# Patient Record
(demographics unavailable — no encounter records)

---

## 2020-10-06 NOTE — ED
Abdominal Pain HPI





- General


Chief Complaint: Abdominal Pain


Stated Complaint: PAIN IN ABD


Time Seen by Provider: 10/06/20 11:42


Source: patient


Mode of arrival: ambulatory


Limitations: no limitations





- History of Present Illness


Initial Comments: 





Patient is a 35-year-old female presenting to the emergency department with 

chief complaint of abdominal pain.  Patient reports the pain started early this 

morning and woke her up out of her sleep.  Patient reports the pain is in the 

right flank region and radiating across to the groin.  Patient denies a history 

of kidney stones.  She denies any urinary or vaginal complaints.  Denies any 

alleviating aggravated factors.  Denies taking medication to alleviate the 

symptoms.  Denies hematuria, hematochezia or melena.  She also complains of 

right upper quadrant abdominal pain that has been ongoing for 5-6 years but her 

primary care physician is not done anything about it.  She does report.  Radial 

pain and going on for several years as well.  No nausea vomiting diarrhea.





- Related Data


                                Home Medications











 Medication  Instructions  Recorded  Confirmed


 


Escitalopram [Lexapro] 20 mg PO HS 10/06/20 10/06/20











                                    Allergies











Allergy/AdvReac Type Severity Reaction Status Date / Time


 


No Known Allergies Allergy   Verified 10/06/20 12:07














Review of Systems


ROS Statement: 


Those systems with pertinent positive or pertinent negative responses have been 

documented in the HPI.





ROS Other: All systems not noted in ROS Statement are negative.





Past Medical History


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


History of Any Multi-Drug Resistant Organisms: None Reported


Past Surgical History: Ablation


Additional Past Surgical History / Comment(s): Heart ablation


Past Psychological History: No Psychological Hx Reported


Smoking Status: Current every day smoker


Past Alcohol Use History: None Reported


Past Drug Use History: None Reported





General Exam


Limitations: no limitations


General appearance: alert, in no apparent distress, obese


Head exam: Present: atraumatic, normocephalic, normal inspection


Eye exam: Present: normal appearance, PERRL, EOMI


Pupils: Present: normal accommodation


ENT exam: Present: normal exam, normal oropharynx, mucous membranes moist, TM's 

normal bilaterally, normal external ear exam


Neck exam: Present: normal inspection, full ROM.  Absent: tenderness


Respiratory exam: Present: normal lung sounds bilaterally.  Absent: respiratory 

distress, wheezes


Cardiovascular Exam: Present: regular rate, normal rhythm, normal heart sounds


GI/Abdominal exam: Present: soft, tenderness (Right upper quadrant, right flank 

pain.  Positive Tubbs sign).  Absent: distended, guarding, rebound, rigid


Extremities exam: Present: normal inspection, full ROM, normal capillary refill.

 Absent: tenderness


Back exam: Present: normal inspection, full ROM, CVA tenderness (R).  Absent: 

tenderness, CVA tenderness (L)


Neurological exam: Present: alert, oriented X3, normal gait


Psychiatric exam: Present: normal affect, normal mood


Skin exam: Present: warm, dry, intact, normal color





Course


                                   Vital Signs











  10/06/20 10/06/20





  11:36 13:15


 


Temperature 97.1 F L 


 


Pulse Rate 61 62


 


Respiratory 16 16





Rate  


 


Blood Pressure 131/89 115/69


 


O2 Sat by Pulse 100 99





Oximetry  














Medical Decision Making





- Medical Decision Making





Patient is a 35-year-old male presenting to emergency Department with chief 

complaint of abdominal pain.  The patient's biggest complaint is right flank 

pain that started this morning, sudden onset and sharp in nature.  It is also 

colicky.  Physical examination reveals right CVA tenderness.  She also has right

upper quadrant abdominal pain.  CBC reveals mild leukocytosis.  CMP is in a 

Jesus well.  UA shows small amounts of blood and red blood cells.  I suspect the

patient has already passed a renal stone.  Right upper quadrant ultrasound was 

obtained revealing gallstones without evidence of cholecystitis.  No signs of 

hydronephrosis.  I suspect this is a chronic issue for the patient.  I will give

her contact information for a general surgeon.  Patient was advised about eating

a low-fat diet.  Strict return parameters were thoroughly discussed with patient

was standing agreeable.  Case discussed with physician.





- Lab Data


Result diagrams: 


                                 10/06/20 11:51





                                 10/06/20 11:51


                                   Lab Results











  10/06/20 10/06/20 10/06/20 Range/Units





  11:51 11:51 11:51 


 


WBC  13.5 H    (3.8-10.6)  k/uL


 


RBC  4.45    (3.80-5.40)  m/uL


 


Hgb  14.5    (11.4-16.0)  gm/dL


 


Hct  44.1    (34.0-46.0)  %


 


MCV  99.1    (80.0-100.0)  fL


 


MCH  32.5    (25.0-35.0)  pg


 


MCHC  32.8    (31.0-37.0)  g/dL


 


RDW  13.1    (11.5-15.5)  %


 


Plt Count  370    (150-450)  k/uL


 


Neutrophils %  84    %


 


Lymphocytes %  12    %


 


Monocytes %  2    %


 


Eosinophils %  2    %


 


Basophils %  0    %


 


Neutrophils #  11.3 H    (1.3-7.7)  k/uL


 


Lymphocytes #  1.6    (1.0-4.8)  k/uL


 


Monocytes #  0.3    (0-1.0)  k/uL


 


Eosinophils #  0.3    (0-0.7)  k/uL


 


Basophils #  0.1    (0-0.2)  k/uL


 


Sodium     (137-145)  mmol/L


 


Potassium     (3.5-5.1)  mmol/L


 


Chloride     ()  mmol/L


 


Carbon Dioxide     (22-30)  mmol/L


 


Anion Gap     mmol/L


 


BUN     (7-17)  mg/dL


 


Creatinine     (0.52-1.04)  mg/dL


 


Est GFR (CKD-EPI)AfAm     (>60 ml/min/1.73 sqM)  


 


Est GFR (CKD-EPI)NonAf     (>60 ml/min/1.73 sqM)  


 


Glucose     (74-99)  mg/dL


 


Calcium     (8.4-10.2)  mg/dL


 


Total Bilirubin     (0.2-1.3)  mg/dL


 


AST     (14-36)  U/L


 


ALT     (4-34)  U/L


 


Alkaline Phosphatase     ()  U/L


 


Total Protein     (6.3-8.2)  g/dL


 


Albumin     (3.5-5.0)  g/dL


 


Lipase     ()  U/L


 


Urine Color   Yellow   


 


Urine Appearance   Clear   (Clear)  


 


Urine pH   8.0   (5.0-8.0)  


 


Ur Specific Gravity   1.026   (1.001-1.035)  


 


Urine Protein   Trace H   (Negative)  


 


Urine Glucose (UA)   Negative   (Negative)  


 


Urine Ketones   Negative   (Negative)  


 


Urine Blood   Small H   (Negative)  


 


Urine Nitrite   Negative   (Negative)  


 


Urine Bilirubin   Negative   (Negative)  


 


Urine Urobilinogen   <2.0   (<2.0)  mg/dL


 


Ur Leukocyte Esterase   Negative   (Negative)  


 


Urine RBC   16 H   (0-5)  /hpf


 


Urine WBC   1   (0-5)  /hpf


 


Urine Mucus   Rare H   (None)  /hpf


 


Urine HCG, Qual    Not Detected  (Not Detectd)  














  10/06/20 Range/Units





  11:51 


 


WBC   (3.8-10.6)  k/uL


 


RBC   (3.80-5.40)  m/uL


 


Hgb   (11.4-16.0)  gm/dL


 


Hct   (34.0-46.0)  %


 


MCV   (80.0-100.0)  fL


 


MCH   (25.0-35.0)  pg


 


MCHC   (31.0-37.0)  g/dL


 


RDW   (11.5-15.5)  %


 


Plt Count   (150-450)  k/uL


 


Neutrophils %   %


 


Lymphocytes %   %


 


Monocytes %   %


 


Eosinophils %   %


 


Basophils %   %


 


Neutrophils #   (1.3-7.7)  k/uL


 


Lymphocytes #   (1.0-4.8)  k/uL


 


Monocytes #   (0-1.0)  k/uL


 


Eosinophils #   (0-0.7)  k/uL


 


Basophils #   (0-0.2)  k/uL


 


Sodium  136 L  (137-145)  mmol/L


 


Potassium  5.3 H  (3.5-5.1)  mmol/L


 


Chloride  106  ()  mmol/L


 


Carbon Dioxide  24  (22-30)  mmol/L


 


Anion Gap  6  mmol/L


 


BUN  17  (7-17)  mg/dL


 


Creatinine  0.53  (0.52-1.04)  mg/dL


 


Est GFR (CKD-EPI)AfAm  >90  (>60 ml/min/1.73 sqM)  


 


Est GFR (CKD-EPI)NonAf  >90  (>60 ml/min/1.73 sqM)  


 


Glucose  124 H  (74-99)  mg/dL


 


Calcium  9.3  (8.4-10.2)  mg/dL


 


Total Bilirubin  0.8  (0.2-1.3)  mg/dL


 


AST  29  (14-36)  U/L


 


ALT  15  (4-34)  U/L


 


Alkaline Phosphatase  52  ()  U/L


 


Total Protein  7.8  (6.3-8.2)  g/dL


 


Albumin  4.4  (3.5-5.0)  g/dL


 


Lipase  52  ()  U/L


 


Urine Color   


 


Urine Appearance   (Clear)  


 


Urine pH   (5.0-8.0)  


 


Ur Specific Gravity   (1.001-1.035)  


 


Urine Protein   (Negative)  


 


Urine Glucose (UA)   (Negative)  


 


Urine Ketones   (Negative)  


 


Urine Blood   (Negative)  


 


Urine Nitrite   (Negative)  


 


Urine Bilirubin   (Negative)  


 


Urine Urobilinogen   (<2.0)  mg/dL


 


Ur Leukocyte Esterase   (Negative)  


 


Urine RBC   (0-5)  /hpf


 


Urine WBC   (0-5)  /hpf


 


Urine Mucus   (None)  /hpf


 


Urine HCG, Qual   (Not Detectd)  














Disposition


Clinical Impression: 


 Renal stone, Right flank pain, Gallstone





Disposition: HOME SELF-CARE


Condition: Stable


Instructions (If sedation given, give patient instructions):  Kidney Stones 

(ED), Cholecystitis (ED), Low Fat Diet (ED)


Additional Instructions: 


Please follow up with a general surgeon.  It a low-fat diet.  Return to 

emergency department if symptoms worsen.


Is patient prescribed a controlled substance at d/c from ED?: No


Referrals: 


MADDIE Dominguez III, MD [Primary Care Provider] - 1-2 days


Agustin Nunez MD [Medical Doctor] - 1-2 days


Time of Disposition: 14:03

## 2020-10-06 NOTE — US
EXAMINATION TYPE: US abdomen limited

 

DATE OF EXAM: 10/6/2020

 

COMPARISON: [facility] 2013

 

CLINICAL HISTORY: ruq pain. Right lateral abd pain radiating downward, noted after meals, nausea toda
y

 

EXAM MEASUREMENTS:

 

Liver Length:  17.5 cm   

Gallbladder Wall:  0.3 cm   

CBD:  0.4 cm

Right Kidney:  11.0 x 5.5 x 4.5 cm

 

 

 

Pancreas: Homogeneously hyperechoic

Liver:  attenuated posteriorly suggests fatty liver  

Gallbladder:  multiple shadowing stones seen with limited mobility of stones 

**Evidence for sonographic Tubbs's sign:  tender here per patient

CBD:  wnl 

Right Kidney:  No hydronephrosis or masses seen 

 

Pancreas shows no worrisome mass or ductal dilatation images saved. IVC seen hepatic dome. Visualized
 liver is heterogeneous without worrisome mass on images saved. Evaluation for masses suboptimal due 
to the heterogeneity. Common bile duct measures within normal limits. Right kidney shows no hydroneph
rosis. Gallbladder seen with shadowing mobile gallstones. No pericholecystic fluid or abnormal gallbl
adder wall thickening.

 

IMPRESSION: Gallstones without convincing secondary ultrasound evidence for acute cholecystitis howev
er in patient with right upper quadrant pain and sonographic positive Tubbs sign it cannot be entire
ly excluded. Consider HIDA scan follow-up.

## 2020-10-08 NOTE — CT
EXAMINATION TYPE: CT abdomen pelvis wo/w con

 

DATE OF EXAM: 10/8/2020

 

COMPARISON:  5/18/2013  

 

HISTORY: Right side abdominal pain and hematuria

 

CT DLP: 3528 mGycm

Automated exposure control for dose reduction was used.

 

TECHNIQUE:  Helical acquisition of images was performed from the lung bases through the pelvis.

 

CONTRAST: Performed with Oral Contrast and without and with IV Contrast, patient injected with 100 mL
 of Isovue 300.

 

FINDINGS: 

 

LUNG BASES: No significant abnormality is appreciated.

 

LIVER/GB/PANCREAS: The gallbladder is prominently distended etiology unclear. There is no pericholecy
stic edematous change within the anterior pararenal space to suggest CT diagnosis of cholecystitis.

 

The pancreatic parenchyma is diffusely low in attenuation and, therefore, clinical exclusion of pancr
eatitis is requested. 

 

There is no intrahepatic biliary tree dilation, and there is no definite extra hepatic biliary tree d
ilation.

 

SPLEEN: No significant abnormality is seen.

 

ADRENALS: No significant abnormality is seen.

 

KIDNEYS AND URETERS AND BLADDER: No hydronephrosis or hydroureter. No renal or ureteral calcification
s. No urinary bladder calcifications. No CT correlate for hematuria.

 

FREE AIR:  No free air is visualized.

 

RETROPERITONEAL ADENOPATHY:  None visualized

 

REPRODUCTIVE ORGANS: No significant abnormality is seen

 

PELVIC ADENOPATHY:  None visualized.

 

OSSEOUS STRUCTURES:  No significant abnormality is seen.

 

BOWEL:  No significant abnormality is seen.

 

OTHER: No acute vascular findings.

 

IMPRESSION: 

1. Gallbladder enlargement; clinical exclusion of cholecystitis requested.

2.  Edematous pancreatic parenchyma; clinical exclusion of pancreatitis requested.

## 2020-10-28 NOTE — NM
EXAMINATION TYPE: NM hepatobiliary wo EF

 

DATE OF EXAM: 10/28/2020

 

COMPARISON: NONE

 

HISTORY: Cholecystectomy. Possible bile leak.

 

TECHNIQUE: After the intravenous administration of 5.5 mCi Tc 99m Mebrofenin hepatobiliary scintigrap
hy is performed.  Immediate images post injection.

 

FINDINGS: 

There is prompt uptake of the tracer by the liver that has normal size and contour. There is tracer a
ccumulation at the inferior right lobe of the liver consistent with a mild leak. This does not appear
 to be within the small bowel. There is also some small bowel activity in the mid abdomen.

 

IMPRESSION: Evidence of a bile leak. Most of the tracer appears to be accumulating outside of the bow
el.

## 2021-05-17 NOTE — XR
EXAMINATION TYPE: XR lumbar spine 2 or 3V

 

DATE OF EXAM: 5/17/2021

 

CLINICAL HISTORY: Left leg numbness for 2 days. Low back pain.

 

TECHNIQUE: Frontal and lateral images of the lumbar spine are obtained.

 

COMPARISON: CT abdomen and pelvis October 8, 2020

 

FINDINGS:  There are 5 lumbar type vertebral bodies identified.  The lumbar spine shows satisfactory 
alignment without evidence of acute fracture or dislocation. Vertebral body heights and disk space he
ights remain within normal limits. Some facet arthropathy lower lumbar spine redemonstrated.  Cholecy
stectomy clips noted in the interval from recent CT.

 

IMPRESSION: As above. No significant change from recent CT

## 2021-05-17 NOTE — ED
General Adult HPI





- General


Chief complaint: Extremity Problem,Nontraumatic


Stated complaint: numbness in leg


Time Seen by Provider: 05/17/21 10:09


Source: patient, RN notes reviewed


Mode of arrival: ambulatory


Limitations: no limitations





- History of Present Illness


Initial comments: 





36-year-old female presents to the emergency room for left foot numbness.  

Patient reports her foot has been tingling for the past 2 days on and off.  

Patient denies any weakness in the foot.  States she does station when touching 

the foot.  Patient denies any headaches, difficulty speaking.  Patient reports 

she has pain in the left lower back and buttock as well.  States this has been 

ongoing for about a week.  Patient states his flares up after she works.Patient 

has no other complaints at this time including shortness of breath, chest pain, 

abdominal pain, nausea or vomiting, headache, or visual changes.





- Related Data


                                Home Medications











 Medication  Instructions  Recorded  Confirmed


 


Escitalopram [Lexapro] 20 mg PO DAILY 10/06/20 05/17/21


 


busPIRone HCL [Buspar] 7.5 mg PO BID 05/17/21 05/17/21








                                  Previous Rx's











 Medication  Instructions  Recorded


 


predniSONE 50 mg PO DAILY #5 tablet 05/17/21











                                    Allergies











Allergy/AdvReac Type Severity Reaction Status Date / Time


 


No Known Allergies Allergy   Verified 05/17/21 10:25














Review of Systems


ROS Statement: 


Those systems with pertinent positive or pertinent negative responses have been 

documented in the HPI.





ROS Other: All systems not noted in ROS Statement are negative.





Past Medical History


Past Medical History: No Reported History


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


History of Any Multi-Drug Resistant Organisms: None Reported


Past Surgical History: Ablation


Additional Past Surgical History / Comment(s): Heart ablation


Past Psychological History: Anxiety, Depression


Smoking Status: Current every day smoker


Past Alcohol Use History: None Reported


Past Drug Use History: None Reported





General Exam


Limitations: no limitations


General appearance: alert, in no apparent distress


Head exam: Present: atraumatic, normocephalic, normal inspection


Eye exam: Present: normal appearance, PERRL, EOMI.  Absent: scleral icterus, 

conjunctival injection, periorbital swelling


ENT exam: Present: normal exam


Neck exam: Present: normal inspection, full ROM.  Absent: tenderness, 

meningismus, lymphadenopathy


Respiratory exam: Present: normal lung sounds bilaterally.  Absent: respiratory 

distress, wheezes


Cardiovascular Exam: Present: regular rate, normal rhythm, normal heart sounds. 

 Absent: systolic murmur, diastolic murmur, rubs, gallop, clicks


GI/Abdominal exam: Present: soft, normal bowel sounds.  Absent: distended, 

tenderness, guarding, rebound, rigid


Extremities exam: Present: full ROM (Full range of motion of the left leg), 

tenderness (Tenderness to the lateral hip and thigh area.), normal capillary 

refill (Capillary refill less than 2 seconds, DP and PT pulses 2+.), other 

(Positive straight leg raise test.).  Absent: pedal edema, joint swelling, calf 

tenderness





Course


                                   Vital Signs











  05/17/21





  10:01


 


Temperature 97.6 F


 


Pulse Rate 77


 


Respiratory 16





Rate 


 


Blood Pressure 129/77


 


O2 Sat by Pulse 98





Oximetry 














Medical Decision Making





- Medical Decision Making





Vitals are stable.  Patient is complaining of left foot paresthesias.  She does 

have tingling.  Full sensation however on palpation.  No weakness in the left 

leg.  Neurovascular status intact.  Patient does have a positive straight leg 

raise test and has pain in the lateral hip area.  No bladder or bowel changes, 

fever, saddle anesthesia.  Symptoms are consistent with a lumbar radiculopathy. 

 HCG is negative.  X-ray of the lumbar spine shows some facet arthropathy 

however no significant change.  Patient will be treated with steroids.  Recommen

d that she follow up with orthopedics.  She will return for any worsening 

symptoms.





- Lab Data


                                   Lab Results











  05/17/21 Range/Units





  10:35 


 


Urine HCG, Qual  Not Detected  (Not Detectd)  














Disposition


Clinical Impression: 


 Paresthesia





Disposition: HOME SELF-CARE


Condition: Good


Instructions (If sedation given, give patient instructions):  Lumbar 

Radiculopathy (ED), Paresthesia (ED)


Additional Instructions: 


Take steroid as directed.  Follow-up with orthopedics.  If you have worsening 

symptoms return to the emergency room.


Prescriptions: 


predniSONE 50 mg PO DAILY #5 tablet


Is patient prescribed a controlled substance at d/c from ED?: No


Referrals: 


MADDIE Dominguez III, MD [Primary Care Provider] - 1-2 days


Efrain Edwards DO [Doctor of Osteopathic Medicine] - 1-2 days


Time of Disposition: 11:33